# Patient Record
Sex: MALE | Race: WHITE
[De-identification: names, ages, dates, MRNs, and addresses within clinical notes are randomized per-mention and may not be internally consistent; named-entity substitution may affect disease eponyms.]

---

## 2019-01-22 ENCOUNTER — HOSPITAL ENCOUNTER (EMERGENCY)
Dept: HOSPITAL 46 - ED | Age: 84
Discharge: HOME | End: 2019-01-22
Payer: MEDICARE

## 2019-01-22 VITALS — WEIGHT: 234.99 LBS | HEIGHT: 72 IN | BODY MASS INDEX: 31.83 KG/M2

## 2019-01-22 DIAGNOSIS — I10: ICD-10-CM

## 2019-01-22 DIAGNOSIS — Z85.828: ICD-10-CM

## 2019-01-22 DIAGNOSIS — Z79.899: ICD-10-CM

## 2019-01-22 DIAGNOSIS — Z79.84: ICD-10-CM

## 2019-01-22 DIAGNOSIS — E11.9: ICD-10-CM

## 2019-01-22 DIAGNOSIS — R42: Primary | ICD-10-CM

## 2019-01-22 NOTE — EKG
[facility]
                                    [address]
                                  [place]  [address]
_________________________________________________________________________________________
                                                                 Signed   
 
 
Sinus rhythm with 1st degree AV block
Left anterior fascicular block
Possible Anterolateral infarct , age undetermined
Abnormal ECG
No previous ECGs available
Confirmed by YULISSA LYLE MD ([id number]) on 1/22/2019 11:20:16 PM
 
 
 
 
 
 
 
 
 
 
 
 
 
 
 
 
 
 
 
 
 
 
 
 
 
 
 
 
 
 
 
 
 
 
 
 
 
 
 
    Electronically Signed By: YULISSA LYLE MD  01/22/19 2320
_________________________________________________________________________________________
PATIENT NAME:     ALE CORBETT                   
MEDICAL RECORD #: [id number]                     Electrocardiogram             
          ACCT #: [id number]  
DATE OF BIRTH:   [birth year]                                       
PHYSICIAN:   YULISSA LYLE MD           REPORT #: [id number]
REPORT IS CONFIDENTIAL AND NOT TO BE RELEASED WITHOUT AUTHORIZATION

## 2024-10-08 ENCOUNTER — HOSPITAL ENCOUNTER (EMERGENCY)
Dept: HOSPITAL 46 - ED | Age: 89
Discharge: HOME | End: 2024-10-08
Payer: MEDICARE

## 2024-10-08 VITALS — BODY MASS INDEX: 25.86 KG/M2 | HEIGHT: 72 IN | WEIGHT: 190.92 LBS

## 2024-10-08 VITALS — DIASTOLIC BLOOD PRESSURE: 92 MMHG | SYSTOLIC BLOOD PRESSURE: 168 MMHG

## 2024-10-08 DIAGNOSIS — R31.0: ICD-10-CM

## 2024-10-08 DIAGNOSIS — R33.8: ICD-10-CM

## 2024-10-08 DIAGNOSIS — Z79.899: ICD-10-CM

## 2024-10-08 DIAGNOSIS — I10: ICD-10-CM

## 2024-10-08 DIAGNOSIS — N40.1: Primary | ICD-10-CM

## 2024-10-08 DIAGNOSIS — Z79.84: ICD-10-CM

## 2024-10-08 DIAGNOSIS — E11.9: ICD-10-CM

## 2024-10-08 LAB
ALBUMIN SERPL-MCNC: 3.1 G/DL (ref 3.4–5)
ALBUMIN/GLOB SERPL: 0.94 {RATIO} (ref 1.1–2.4)
ALP SERPL-CCNC: 518 U/L (ref 46–116)
ALT SERPL W P-5'-P-CCNC: 17 U/L (ref 14–59)
ANION GAP SERPL CALCULATED.4IONS-SCNC: 10.6 MMOL/L (ref 7–21)
AST SERPL-CCNC: 34 U/L (ref 15–37)
BACTERIA #/AREA URNS HPF: (no result) /HPF
BASOPHILS NFR BLD AUTO: 0.5 % (ref 0–2)
BUN SERPL-MCNC: 9 MG/DL (ref 7–18)
BUN/CREAT SERPL: 11.84 (ref 6–28.6)
CALCIUM SERPL-MCNC: 9 MG/DL (ref 8.5–10.1)
CASTS #/AREA URNS LPF: (no result) "LPF"
CHLORIDE SERPL-SCNC: 101 MMOL/L (ref 98–107)
CO2 SERPL-SCNC: 28 MMOL/L (ref 21–32)
CRYSTALS URNS MICRO: (no result)
DEPRECATED RDW RBC AUTO: 14.8 FL (ref 10.5–15)
EGFRCR SERPLBLD CKD-EPI 2021: 87 ML/MIN (ref 60–?)
EOSINOPHIL NFR BLD AUTO: 0.7 % (ref 0–6)
EPI CELLS #/AREA URNS HPF: 0 /LPF
GLOBULIN SER-MCNC: 3.3 G/DL (ref 1.8–3.5)
HCT VFR BLD AUTO: 31.8 % (ref 35–50)
HGB BLD-MCNC: 11 G/DL (ref 12–18)
HGB UR QL STRIP: (no result)
KETONES UR QL STRIP: (no result)
LEUKOCYTE ESTERASE UR QL STRIP: (no result)
LYMPHOCYTES NFR BLD AUTO: 34.1 % (ref 24–44)
MCH RBC QN AUTO: 28.6 PG (ref 27–36)
MCHC RBC AUTO-ENTMCNC: 34.5 G/DL (ref 30–36)
MCV RBC AUTO: 82.9 FL (ref 81–99)
MONOCYTES NFR BLD AUTO: 15.1 % (ref 0–12)
NEUTROPHILS NFR BLD AUTO: 49.6 % (ref 39–80)
NITRITE UR QL STRIP: (no result)
PLATELET # BLD AUTO: 110 K/UL (ref 140–440)
POTASSIUM SERPL-SCNC: 4.6 MMOL/L (ref 3.5–5.1)
PROT SERPL-MCNC: 6.4 G/DL (ref 6.4–8.2)
RBC # BLD AUTO: 3.83 M/UL (ref 4.3–5.7)
URN SPEC COLLECT METH UR: (no result)

## 2024-10-09 ENCOUNTER — HOSPITAL ENCOUNTER (EMERGENCY)
Dept: HOSPITAL 46 - ED | Age: 89
Discharge: HOME | End: 2024-10-09
Payer: MEDICARE

## 2024-10-09 VITALS — HEIGHT: 72 IN | WEIGHT: 209.44 LBS | BODY MASS INDEX: 28.37 KG/M2

## 2024-10-09 VITALS — SYSTOLIC BLOOD PRESSURE: 109 MMHG | DIASTOLIC BLOOD PRESSURE: 73 MMHG

## 2024-10-09 DIAGNOSIS — Z79.899: ICD-10-CM

## 2024-10-09 DIAGNOSIS — E10.9: ICD-10-CM

## 2024-10-09 DIAGNOSIS — Z79.84: ICD-10-CM

## 2024-10-09 DIAGNOSIS — T83.091A: Primary | ICD-10-CM

## 2024-10-09 DIAGNOSIS — I10: ICD-10-CM

## 2024-10-09 DIAGNOSIS — R31.9: ICD-10-CM

## 2024-10-09 NOTE — XMS
PreManage Notification: ALE CORBETT MRN:[id number]
 
Security Information
 
Security Events
No recent Security Events currently on file
 
 
 
CRITERIA MET
------------
- [facility] - 2 Visits in 30 Days
 
 
CARE PROVIDERS
There are no care providers on record at this time.
 
Felipe has no Care Guidelines for this patient.
 
ROSALBA VISIT COUNT (12 MO.)
-------------------------------------------------------------------------------------
2 [facility]
-------------------------------------------------------------------------------------
TOTAL 2
-------------------------------------------------------------------------------------
NOTE: Visits indicate total known visits.
 
ED/C VISIT TRACKING (12 MO.)
-------------------------------------------------------------------------------------
10/09/2024 16:17
[facility] [place]
 
TYPE: Emergency
 
COMPLAINT:
- CATH ISSUE
-------------------------------------------------------------------------------------
10/08/2024 15:36
[facility] [place]
 
TYPE: Emergency
 
COMPLAINT:
- URINATING BLOOD
-------------------------------------------------------------------------------------
 
 
INPATIENT VISIT TRACKING (12 MO.)
No inpatient visits to display in this time frame
 
[email or web address]

## 2024-10-19 ENCOUNTER — HOSPITAL ENCOUNTER (EMERGENCY)
Dept: HOSPITAL 46 - ED | Age: 89
Discharge: HOME | End: 2024-10-19
Payer: MEDICARE

## 2024-10-19 VITALS — SYSTOLIC BLOOD PRESSURE: 170 MMHG | DIASTOLIC BLOOD PRESSURE: 88 MMHG

## 2024-10-19 VITALS — WEIGHT: 209.44 LBS | HEIGHT: 72 IN | BODY MASS INDEX: 28.37 KG/M2

## 2024-10-19 DIAGNOSIS — F03.90: ICD-10-CM

## 2024-10-19 DIAGNOSIS — I10: ICD-10-CM

## 2024-10-19 DIAGNOSIS — E11.9: ICD-10-CM

## 2024-10-19 DIAGNOSIS — Z79.84: ICD-10-CM

## 2024-10-19 DIAGNOSIS — T83.091A: Primary | ICD-10-CM
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
## 2024-10-19 DIAGNOSIS — Z79.899: ICD-10-CM

## 2024-10-19 LAB
ALBUMIN SERPL-MCNC: 3.2 G/DL (ref 3.4–5)
ALBUMIN/GLOB SERPL: 0.89 {RATIO} (ref 1.1–2.4)
ALP SERPL-CCNC: 497 U/L (ref 46–116)
ALT SERPL W P-5'-P-CCNC: 18 U/L (ref 14–59)
ANION GAP SERPL CALCULATED.4IONS-SCNC: 10.3 MMOL/L (ref 7–21)
AST SERPL-CCNC: 25 U/L (ref 15–37)
BASOPHILS NFR BLD AUTO: 0.3 % (ref 0–2)
BUN SERPL-MCNC: 13 MG/DL (ref 7–18)
BUN/CREAT SERPL: 16.66 (ref 6–28.6)
CALCIUM SERPL-MCNC: 8.5 MG/DL (ref 8.5–10.1)
CHLORIDE SERPL-SCNC: 100 MMOL/L (ref 98–107)
CO2 SERPL-SCNC: 28 MMOL/L (ref 21–32)
DEPRECATED RDW RBC AUTO: 14.5 FL (ref 10.5–15)
EGFRCR SERPLBLD CKD-EPI 2021: 86 ML/MIN (ref 60–?)
EOSINOPHIL NFR BLD AUTO: 1.2 % (ref 0–6)
GLOBULIN SER-MCNC: 3.6 G/DL (ref 1.8–3.5)
HCT VFR BLD AUTO: 28.7 % (ref 35–50)
HGB BLD-MCNC: 10.1 G/DL (ref 12–18)
LYMPHOCYTES NFR BLD AUTO: 30.7 % (ref 24–44)
MCH RBC QN AUTO: 28.4 PG (ref 27–36)
MCHC RBC AUTO-ENTMCNC: 35.3 G/DL (ref 30–36)
MCV RBC AUTO: 80.4 FL (ref 81–99)
MONOCYTES NFR BLD AUTO: 14.2 % (ref 0–12)
NEUTROPHILS NFR BLD AUTO: 53.6 % (ref 39–80)
PLATELET # BLD AUTO: 125 K/UL (ref 140–440)
POTASSIUM SERPL-SCNC: 4.3 MMOL/L (ref 3.5–5.1)
PROT SERPL-MCNC: 6.8 G/DL (ref 6.4–8.2)
RBC # BLD AUTO: 3.57 M/UL (ref 4.3–5.7)

## 2024-10-19 NOTE — XMS
PreManage Notification: ALE CORBETT MRN:[id number]
 
Security Information
 
Security Events
No recent Security Events currently on file
 
 
 
CRITERIA MET
------------
- [facility] - 2 Visits in 30 Days
 
 
CARE PROVIDERS
There are no care providers on record at this time.
 
Felipe has no Care Guidelines for this patient.
 
ROSALBA VISIT COUNT (12 MO.)
-------------------------------------------------------------------------------------
3 [facility] [place]
-------------------------------------------------------------------------------------
TOTAL 4
-------------------------------------------------------------------------------------
NOTE: Visits indicate total known visits.
 
ED/UCC VISIT TRACKING (12 MO.)
-------------------------------------------------------------------------------------
10/19/2024 16:34
[facility] [place]
 
TYPE: Emergency
 
COMPLAINT:
- CATHETER ISSUE
-------------------------------------------------------------------------------------
10/13/2024 18:33
[facility]
 
TYPE: Emergency
 
DIAGNOSES:
- Gross hematuria
- Other specified disorders of prostate
- Hematuria
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
-------------------------------------------------------------------------------------
10/09/2024 16:17
[facility] [place]
 
TYPE: Emergency
 
COMPLAINT:
- CATH ISSUE
 
 
DIAGNOSES:
- Essential (primary) hypertension
- Hematuria, unspecified
- Long term (current) use of oral hypoglycemic drugs
- Other long term (current) drug therapy
- Other mechanical complication of indwelling urethral catheter, initial encounter
- Retention of urine, unspecified
- Type 1 diabetes mellitus without complications
-------------------------------------------------------------------------------------
10/08/2024 15:36
[facility] [place]
 
TYPE: Emergency
 
COMPLAINT:
- URINATING BLOOD
 
DIAGNOSES:
- Benign prostatic hyperplasia with lower urinary tract symptoms
- Essential (primary) hypertension
- Gross hematuria
- Long term (current) use of oral hypoglycemic drugs
- Other long term (current) drug therapy
- Other retention of urine
- Type 2 diabetes mellitus without complications
-------------------------------------------------------------------------------------
 
 
INPATIENT VISIT TRACKING (12 MO.)
No inpatient visits to display in this time frame
 
[email or web address]

## 2024-12-05 ENCOUNTER — HOSPITAL ENCOUNTER (EMERGENCY)
Dept: HOSPITAL 46 - ED | Age: 89
Discharge: HOME | End: 2024-12-05
Payer: MEDICARE

## 2024-12-05 VITALS — DIASTOLIC BLOOD PRESSURE: 75 MMHG | SYSTOLIC BLOOD PRESSURE: 164 MMHG

## 2024-12-05 VITALS — HEIGHT: 72 IN | BODY MASS INDEX: 26.52 KG/M2 | WEIGHT: 195.77 LBS

## 2024-12-05 DIAGNOSIS — E10.9: ICD-10-CM

## 2024-12-05 DIAGNOSIS — N39.0: Primary | ICD-10-CM

## 2024-12-05 DIAGNOSIS — B37.2: ICD-10-CM

## 2024-12-05 DIAGNOSIS — Z79.899: ICD-10-CM

## 2024-12-05 DIAGNOSIS — Z79.84: ICD-10-CM

## 2024-12-05 DIAGNOSIS — D64.9: ICD-10-CM

## 2024-12-05 DIAGNOSIS — I10: ICD-10-CM

## 2024-12-05 LAB
ALBUMIN SERPL-MCNC: 2.5 G/DL (ref 3.4–5)
ALBUMIN/GLOB SERPL: 0.51 {RATIO} (ref 1.1–2.4)
ALP SERPL-CCNC: 924 U/L (ref 46–116)
ALT SERPL W P-5'-P-CCNC: 18 U/L (ref 14–59)
ANION GAP SERPL CALCULATED.4IONS-SCNC: 12.6 MMOL/L (ref 7–21)
AST SERPL-CCNC: 61 U/L (ref 15–37)
BACTERIA #/AREA URNS HPF: (no result) /HPF
BASOPHILS NFR BLD AUTO: 1.2 % (ref 0–2)
BLD SMEAR INTERP: (no result)
BUN SERPL-MCNC: 23 MG/DL (ref 7–18)
BUN/CREAT SERPL: 29.48 (ref 6–28.6)
CALCIUM SERPL-MCNC: 8.5 MG/DL (ref 8.5–10.1)
CASTS #/AREA URNS LPF: (no result) "LPF"
CHLORIDE SERPL-SCNC: 97 MMOL/L (ref 98–107)
CO2 SERPL-SCNC: 28 MMOL/L (ref 21–32)
DEPRECATED RDW RBC AUTO: 18.4 FL (ref 10.5–15)
EGFRCR SERPLBLD CKD-EPI 2021: 86 ML/MIN (ref 60–?)
EOSINOPHIL NFR BLD AUTO: 1.1 % (ref 0–6)
EPI CELLS #/AREA URNS HPF: (no result) /LPF
GLOBULIN SER-MCNC: 4.9 G/DL (ref 1.8–3.5)
HCT VFR BLD AUTO: 28.5 % (ref 35–50)
HGB BLD-MCNC: 9 G/DL (ref 12–18)
HGB UR QL STRIP: (no result)
KETONES UR QL STRIP: (no result)
LEUKOCYTE ESTERASE UR QL STRIP: (no result)
LYMPHOCYTES NFR BLD AUTO: 23.6 % (ref 24–44)
MAGNESIUM SERPL-MCNC: 1.8 MG/DL (ref 1.8–2.4)
MCH RBC QN AUTO: 22.1 PG (ref 27–36)
MCHC RBC AUTO-ENTMCNC: 31.7 G/DL (ref 30–36)
MCV RBC AUTO: 69.5 FL (ref 81–99)
MONOCYTES NFR BLD AUTO: 13.4 % (ref 0–12)
NEUTROPHILS NFR BLD AUTO: 60.7 % (ref 39–80)
NITRITE UR QL STRIP: POSITIVE
PLATELET # BLD AUTO: 204 K/UL (ref 140–440)
POTASSIUM SERPL-SCNC: 3.6 MMOL/L (ref 3.5–5.1)
PROT SERPL-MCNC: 7.4 G/DL (ref 6.4–8.2)
RBC # BLD AUTO: 4.1 M/UL (ref 4.3–5.7)
URN SPEC COLLECT METH UR: (no result)

## 2025-01-06 ENCOUNTER — HOSPITAL ENCOUNTER (INPATIENT)
Dept: HOSPITAL 46 - ED | Age: 89
LOS: 7 days | Discharge: HOME | DRG: 698 | End: 2025-01-13
Attending: STUDENT IN AN ORGANIZED HEALTH CARE EDUCATION/TRAINING PROGRAM | Admitting: STUDENT IN AN ORGANIZED HEALTH CARE EDUCATION/TRAINING PROGRAM
Payer: MEDICARE

## 2025-01-06 VITALS — BODY MASS INDEX: 23.59 KG/M2 | HEIGHT: 72 IN | WEIGHT: 174.16 LBS

## 2025-01-06 VITALS — DIASTOLIC BLOOD PRESSURE: 74 MMHG | SYSTOLIC BLOOD PRESSURE: 163 MMHG

## 2025-01-06 DIAGNOSIS — Z79.84: ICD-10-CM

## 2025-01-06 DIAGNOSIS — T83.511A: Primary | ICD-10-CM

## 2025-01-06 DIAGNOSIS — C78.00: ICD-10-CM

## 2025-01-06 DIAGNOSIS — N39.0: ICD-10-CM

## 2025-01-06 DIAGNOSIS — Z79.811: ICD-10-CM

## 2025-01-06 DIAGNOSIS — E11.9: ICD-10-CM

## 2025-01-06 DIAGNOSIS — Z79.899: ICD-10-CM

## 2025-01-06 DIAGNOSIS — R53.81: ICD-10-CM

## 2025-01-06 DIAGNOSIS — N13.30: ICD-10-CM

## 2025-01-06 DIAGNOSIS — E87.6: ICD-10-CM

## 2025-01-06 DIAGNOSIS — E83.42: ICD-10-CM

## 2025-01-06 DIAGNOSIS — N17.9: ICD-10-CM

## 2025-01-06 DIAGNOSIS — G25.81: ICD-10-CM

## 2025-01-06 DIAGNOSIS — A41.9: ICD-10-CM

## 2025-01-06 DIAGNOSIS — D50.9: ICD-10-CM

## 2025-01-06 DIAGNOSIS — C44.90: ICD-10-CM

## 2025-01-06 DIAGNOSIS — E78.5: ICD-10-CM

## 2025-01-06 DIAGNOSIS — Z85.51: ICD-10-CM

## 2025-01-06 DIAGNOSIS — C67.9: ICD-10-CM

## 2025-01-06 DIAGNOSIS — E87.1: ICD-10-CM

## 2025-01-06 DIAGNOSIS — Z90.79: ICD-10-CM

## 2025-01-06 DIAGNOSIS — I10: ICD-10-CM

## 2025-01-06 LAB
ALBUMIN SERPL-MCNC: 2.5 G/DL (ref 3.4–5)
ALBUMIN/GLOB SERPL: 0.5 {RATIO} (ref 1.1–2.4)
ALP SERPL-CCNC: 2755 U/L (ref 46–116)
ALT SERPL W P-5'-P-CCNC: 13 U/L (ref 14–59)
ANION GAP SERPL CALCULATED.4IONS-SCNC: 14.7 MMOL/L (ref 7–21)
APTT PPP: 36.9 SEC (ref 22.9–41.3)
AST SERPL-CCNC: 90 U/L (ref 15–37)
BACTERIA #/AREA URNS HPF: (no result) /HPF
BUN SERPL-MCNC: 44 MG/DL (ref 7–18)
BUN/CREAT SERPL: 20.46 (ref 6–28.6)
CALCIUM SERPL-MCNC: 8.2 MG/DL (ref 8.5–10.1)
CASTS #/AREA URNS LPF: (no result) "LPF"
CHLORIDE SERPL-SCNC: 93 MMOL/L (ref 98–107)
CO2 SERPL-SCNC: 25 MMOL/L (ref 21–32)
CRYSTALS URNS MICRO: (no result)
DEPRECATED RDW RBC AUTO: 20.1 FL (ref 10.5–15)
EGFRCR SERPLBLD CKD-EPI 2021: 29 ML/MIN (ref 60–?)
EPI CELLS #/AREA URNS HPF: (no result) /LPF
FLUBV RNA RESP QL NAA+PROBE: NEGATIVE
GLOBULIN SER-MCNC: 5 G/DL (ref 1.8–3.5)
HCT VFR BLD AUTO: 29.8 % (ref 35–50)
HGB BLD-MCNC: 9.5 G/DL (ref 12–18)
HGB UR QL STRIP: (no result)
INR PPP: 1.24 (ref 0.8–1.3)
KETONES UR QL STRIP: (no result)
LACTATE SERPL-SCNC: 1.6 MMOL/L (ref 0.4–2)
LEUKOCYTE ESTERASE UR QL STRIP: (no result)
LYMPHOCYTES NFR BLD MANUAL: 7 %
MCH RBC QN AUTO: 20.5 PG (ref 27–36)
MCHC RBC AUTO-ENTMCNC: 31.9 G/DL (ref 30–36)
MCV RBC AUTO: 64.4 FL (ref 81–99)
MONOCYTES NFR BLD MANUAL: 7 %
NEUTS SEG NFR BLD MANUAL: 86 %
NITRITE UR QL STRIP: POSITIVE
PLATELET # BLD AUTO: 192 K/UL (ref 140–440)
POTASSIUM SERPL-SCNC: 3.7 MMOL/L (ref 3.5–5.1)
PROT SERPL-MCNC: 7.5 G/DL (ref 6.4–8.2)
PROTHROMBIN TIME: 15.6 SEC (ref 11.2–14.2)
RBC # BLD AUTO: 4.63 M/UL (ref 4.3–5.7)
RSV RNA ISLT QL NAA+PROBE: NEGATIVE
URN SPEC COLLECT METH UR: (no result)

## 2025-01-06 PROCEDURE — A9270 NON-COVERED ITEM OR SERVICE: HCPCS

## 2025-01-06 PROCEDURE — G0378 HOSPITAL OBSERVATION PER HR: HCPCS

## 2025-01-06 PROCEDURE — U0002 COVID-19 LAB TEST NON-CDC: HCPCS

## 2025-01-06 NOTE — XMS
PreManage Notification: ALE CORBETT MRN:[id number]
 
Security Information
 
Security Events
No recent Security Events currently on file
 
 
 
CRITERIA MET
------------
- 6 ED Visits in 6 Months
 
 
CARE PROVIDERS
There are no care providers on record at this time.
 
Felipe has no Care Guidelines for this patient.
 
ROSALBA VISIT COUNT (12 MO.)
-------------------------------------------------------------------------------------
5 [facility]
-------------------------------------------------------------------------------------
TOTAL 6
-------------------------------------------------------------------------------------
NOTE: Visits indicate total known visits.
 
ED/UCC VISIT TRACKING (12 MO.)
-------------------------------------------------------------------------------------
01/06/2025 19:25
[facility] [place]
 
TYPE: Emergency
 
COMPLAINT:
- WEAKNESS
-------------------------------------------------------------------------------------
12/05/2024 19:17
[facility] [place]
 
TYPE: Emergency
 
COMPLAINT:
- DIZZINESS
 
DIAGNOSES:
- Anemia, unspecified
- Candidiasis of skin and nail
- Dizziness and giddiness
- Essential (primary) hypertension
- Long term (current) use of oral hypoglycemic drugs
- Other long term (current) drug therapy
- Type 1 diabetes mellitus without complications
- Urinary tract infection, site not specified
-------------------------------------------------------------------------------------
10/19/2024 16:34
[facility]
 
 
TYPE: Emergency
 
COMPLAINT:
- CATHETER ISSUE
 
DIAGNOSES:
- Essential (primary) hypertension
- Long term (current) use of oral hypoglycemic drugs
- Other long term (current) drug therapy
- Other mechanical complication of indwelling urethral catheter, initial encounter
- Type 2 diabetes mellitus without complications
- Unspecified dementia, unspecified severity, without behavioral disturbance,
psychotic disturbance, mood disturbance, and anxiety
- Urinary catheterization as the cause of abnormal reaction of the patient, or of
later complication, without mention of misadventure at the time of the procedure
-------------------------------------------------------------------------------------
10/13/2024 18:33
[facility]
 
TYPE: Emergency
 
DIAGNOSES:
- Gross hematuria
- Other specified disorders of prostate
- Hematuria
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
-------------------------------------------------------------------------------------
10/09/2024 16:17
[facility] [place]
 
TYPE: Emergency
 
COMPLAINT:
- CATH ISSUE
 
DIAGNOSES:
- Essential (primary) hypertension
- Hematuria, unspecified
- Long term (current) use of oral hypoglycemic drugs
- Other long term (current) drug therapy
- Other mechanical complication of indwelling urethral catheter, initial encounter
- Retention of urine, unspecified
- Type 1 diabetes mellitus without complications
-------------------------------------------------------------------------------------
10/08/2024 15:36
[facility] [place]
 
TYPE: Emergency
 
COMPLAINT:
- URINATING BLOOD
 
DIAGNOSES:
- Benign prostatic hyperplasia with lower urinary tract symptoms
- Essential (primary) hypertension
- Gross hematuria
- Long term (current) use of oral hypoglycemic drugs
- Other long term (current) drug therapy
 
- Other retention of urine
- Type 2 diabetes mellitus without complications
-------------------------------------------------------------------------------------
 
 
INPATIENT VISIT TRACKING (12 MO.)
No inpatient visits to display in this time frame
 
[email or web address]

## 2025-01-07 VITALS — SYSTOLIC BLOOD PRESSURE: 104 MMHG | DIASTOLIC BLOOD PRESSURE: 53 MMHG

## 2025-01-07 VITALS — DIASTOLIC BLOOD PRESSURE: 82 MMHG | SYSTOLIC BLOOD PRESSURE: 100 MMHG

## 2025-01-07 VITALS — SYSTOLIC BLOOD PRESSURE: 117 MMHG | DIASTOLIC BLOOD PRESSURE: 52 MMHG

## 2025-01-07 VITALS — DIASTOLIC BLOOD PRESSURE: 59 MMHG | SYSTOLIC BLOOD PRESSURE: 109 MMHG

## 2025-01-07 VITALS — DIASTOLIC BLOOD PRESSURE: 53 MMHG | SYSTOLIC BLOOD PRESSURE: 104 MMHG

## 2025-01-07 VITALS — DIASTOLIC BLOOD PRESSURE: 55 MMHG | SYSTOLIC BLOOD PRESSURE: 117 MMHG

## 2025-01-07 VITALS — DIASTOLIC BLOOD PRESSURE: 52 MMHG | SYSTOLIC BLOOD PRESSURE: 117 MMHG

## 2025-01-07 VITALS — SYSTOLIC BLOOD PRESSURE: 117 MMHG | DIASTOLIC BLOOD PRESSURE: 55 MMHG

## 2025-01-07 VITALS — DIASTOLIC BLOOD PRESSURE: 74 MMHG | SYSTOLIC BLOOD PRESSURE: 163 MMHG

## 2025-01-07 LAB
ALBUMIN SERPL-MCNC: 1.9 G/DL (ref 3.4–5)
ALBUMIN/GLOB SERPL: 0.4 {RATIO} (ref 1.1–2.4)
ALP SERPL-CCNC: 2142 U/L (ref 46–116)
ALT SERPL W P-5'-P-CCNC: 12 U/L (ref 14–59)
ANION GAP SERPL CALCULATED.4IONS-SCNC: 11.3 MMOL/L (ref 7–21)
ANION GAP SERPL CALCULATED.4IONS-SCNC: 12.5 MMOL/L (ref 7–21)
ANION GAP SERPL CALCULATED.4IONS-SCNC: 13.4 MMOL/L (ref 7–21)
AST SERPL-CCNC: 58 U/L (ref 15–37)
BUN SERPL-MCNC: 41 MG/DL (ref 7–18)
BUN SERPL-MCNC: 44 MG/DL (ref 7–18)
BUN SERPL-MCNC: 44 MG/DL (ref 7–18)
BUN/CREAT SERPL: 35.48 (ref 6–28.6)
BUN/CREAT SERPL: 44.44 (ref 6–28.6)
BUN/CREAT SERPL: 49.39 (ref 6–28.6)
CALCIUM SERPL-MCNC: 7.8 MG/DL (ref 8.5–10.1)
CALCIUM SERPL-MCNC: 7.9 MG/DL (ref 8.5–10.1)
CALCIUM SERPL-MCNC: 8 MG/DL (ref 8.5–10.1)
CHLORIDE SERPL-SCNC: 101 MMOL/L (ref 98–107)
CHLORIDE SERPL-SCNC: 98 MMOL/L (ref 98–107)
CHLORIDE SERPL-SCNC: 99 MMOL/L (ref 98–107)
CO2 SERPL-SCNC: 23 MMOL/L (ref 21–32)
CO2 SERPL-SCNC: 23 MMOL/L (ref 21–32)
CO2 SERPL-SCNC: 25 MMOL/L (ref 21–32)
DEPRECATED RDW RBC AUTO: 20.3 FL (ref 10.5–15)
EGFRCR SERPLBLD CKD-EPI 2021: 56 ML/MIN (ref 60–?)
EGFRCR SERPLBLD CKD-EPI 2021: 73 ML/MIN (ref 60–?)
EGFRCR SERPLBLD CKD-EPI 2021: 84 ML/MIN (ref 60–?)
GLOBULIN SER-MCNC: 4.7 G/DL (ref 1.8–3.5)
HCT VFR BLD AUTO: 28 % (ref 35–50)
HGB BLD-MCNC: 8.8 G/DL (ref 12–18)
LYMPHOCYTES NFR BLD MANUAL: 12 %
MAGNESIUM SERPL-MCNC: 1.9 MG/DL (ref 1.8–2.4)
MCH RBC QN AUTO: 20.4 PG (ref 27–36)
MCHC RBC AUTO-ENTMCNC: 31.4 G/DL (ref 30–36)
MCV RBC AUTO: 65.1 FL (ref 81–99)
MONOCYTES NFR BLD MANUAL: 7 %
NEUTS SEG NFR BLD MANUAL: 81 %
PLATELET # BLD AUTO: 164 K/UL (ref 140–440)
POTASSIUM SERPL-SCNC: 3.3 MMOL/L (ref 3.5–5.1)
POTASSIUM SERPL-SCNC: 3.4 MMOL/L (ref 3.5–5.1)
POTASSIUM SERPL-SCNC: 3.5 MMOL/L (ref 3.5–5.1)
PROT SERPL-MCNC: 6.6 G/DL (ref 6.4–8.2)
RBC # BLD AUTO: 4.29 M/UL (ref 4.3–5.7)

## 2025-01-07 PROCEDURE — 0T9B70Z DRAINAGE OF BLADDER WITH DRAINAGE DEVICE, VIA NATURAL OR ARTIFICIAL OPENING: ICD-10-PCS | Performed by: STUDENT IN AN ORGANIZED HEALTH CARE EDUCATION/TRAINING PROGRAM

## 2025-01-07 NOTE — NUR
Digital rectal impaction removal done followed by soap suds enema. Small
amounts of brown smears removed from rectum. Bed bath, viviane and simon care
done at this time. Patient tolerated cares well. Warm blanket and new gown
provided. Call light within reach.

## 2025-01-07 NOTE — NUR
PATIENTS VITALS TAKEN AND RECORDED. PATIENT HAD SMEAR OF BM. ORVILLE CARE
COMPLETED AND NEW ATTEND IN PLACE. PATIENT REPOSITIONED IN BED. MCMILLAN CARE
COMPLETED. MCMILLAN EMPTIED. INTAKE AND OUTPUT RECORDED. PATIENT DENIES ANY AIN
OR NAUSEA. IV INFUSING PER ORDER. PM MEDS GIVEN PER ORDER. PATIENT PROVIDED
SIPS OF WATER. PATIENT PROVIDED WARM BLANKET. ASSESMENT COMPLETED. PATIENT
DENIES ANY FURTHER NEEDS. CALL LIGHT IN REACH. BED ALARM ON FOR SAFETY.

## 2025-01-07 NOTE — EKG
[facility]
                                    [address]
                                  [place]  [address]
_________________________________________________________________________________________
                                                                 Signed   
 
 
Sinus tachycardia with 1st degree AV block
Septal infarct (cited on or before 22-JAN-2019)
Possible Lateral infarct (cited on or before 22-JAN-2019)
Abnormal ECG
When compared with ECG of 27-APR-2021 18:12,
aberrant conduction is no longer present
Confirmed by Will Saucedo MD ([id number]) on 1/7/2025 4:40:42 PM
 
 
 
 
 
 
 
 
 
 
 
 
 
 
 
 
 
 
 
 
 
 
 
 
 
 
 
 
 
 
 
 
 
 
 
 
 
 
    Electronically Signed By: WILL SAUCEDO MD  01/07/25 1640
_________________________________________________________________________________________
PATIENT NAME:     ALE CORBETT                 
MEDICAL RECORD #: [id number]                     Electrocardiogram             
          ACCT #: [id number]  
DATE OF BIRTH:   [birth year]                                       
PHYSICIAN:   WILL SAUCEDO MD                       REPORT #: [id number]
REPORT IS CONFIDENTIAL AND NOT TO BE RELEASED WITHOUT AUTHORIZATION

## 2025-01-07 NOTE — NUR
PATIENT ARRIVED TO THE FLOOR VIA STRETCHER. PATIENT MOVED FROM STRETCHER TO
HOSPITAL BED BY STAFF. PATIENT INCONT STOOL. ORVILLE CARE COMPLETED. ASSEMENT
COMPLETED. IV FLUSHED AND FLUIDS INFUSING PER ORDER. CPOX IN PLACE ON PATIENTS
TOE. VITALS TAKEN AND RECORDED. SON AT BEDSIDE AND ASSISTED WITH ADMISSION.
ADMISSION COMPLETED BY CHARGE RN. PATIENT REPOSITIONED IN BED. SIPS OF WATER
PROVIDED. PATIENT IS ONLY ORIENTED TO SELF. MCMILLAN IN PLACE AND MCMILLAN CARE
COMPLETED. MCMILLAN IS CHRONIC AND CHANGED IN ED.

## 2025-01-07 NOTE — NUR
PATIENT PULLED IV OUT. PLACED NEW IV. PATIENTS IV INFUSING PER ORDER. PATIENTS
VITALS TAKEN AND RECORDD. MCMILLAN EMPTIED. INTAKE AND OUTPUT RECORDED. PATIENT
REPOSITIONED IN BED. PATIENT PROVIDED SIPS OF WATER. PATIENT APPEARS TO BE
RESTING COMFORTABLY. PATIENT REMAINS ONLY ORIENTED TO SELF. BED ALARM ON FOR
SAFETY. BLOOD SENT TO LAB ON NEW IV START.

## 2025-01-07 NOTE — NUR
PATIENT IS RESTING IN BED WITH EYES CLOSED, CPOX READINGS WNL. NAD NOTED. IV
INFUSING PER ORDER. BED ALARM ON FOR SAFETY.

## 2025-01-07 NOTE — NUR
Patient in bed resting, no distress. IV patent, fluids infusing per order.
Patient provided with another protein drink, he reports he likes them, denies
nausea.
Patient reports a poor appetite for several weeks now. Simon remains
patent, urine is cloudy yellow with sediment. Patient denies pain at this
time. Patient notably jumps with simon cath movement during basic cares.
Patient denies needs at this time, personal supplies and call light
within reach.

## 2025-01-07 NOTE — NUR
LATOYA from Dr. Saucedo for one time dose of ropinerole 0.5mg per family request
for pt's restless leg.

## 2025-01-07 NOTE — NUR
PATIENT IS RESTING IN BED WITH EYES CLOSED, CPOX READINGS ARE WNL. IV INFUSING
PER ORDER. CALL LIGHT IN REACH. BED ALARM ON FOR SAFETY.

## 2025-01-07 NOTE — NUR
PATIENT ALERT AND ORIENTED. SON, KHURRAM CHRISTENSEN, IN ROOM AT THIS TIME. PATIENT LIVES
WITH HIS SON IN [place]ING[place] LEVEL HOME. THERE ARE 2 STEPS INTO HOME. DIFFICULTY
GETTING IN AND OUT OF THE HOUSE. SON STATES HE IS UNABLE TO GET PATIENT OUT OF
THE HOUSE. INFORMATION FOR [employer] PROVIDED FOR LIFT ASSIST TO GET
PATIENT IN AND OUT OF THE HOME IF NEEDED. PATIENT STATES HE IS WANTING
TREATMENT FOR HIS CANCER DIAGNOSIS. SON STATES HE HAS AN APPOINTMENT ON
JANUARY 21 FOR FOLLOW-UP. HAS A CHRONIC CATHETER IN PLACE. NO HOME HEALTH OR
CAREGIVERS FOR PATIENT. GRANDDAUGHTER DOES ASSIST AS NEEDED FOR CARES.
DISCUSSED HOME HEALTH OPTION WITH SON AND PATIENT AS AN OPTION FOR DISCHARGE.
SON WOULD PREFER [facility]. PATIENT DOES HAVE WALKER,
WHEELCHAIR, CANE AND SHOWER CHAIR AT HOME. PATIENT DOES NOT USE HIS SHOWER
CHAIR. SON DRIVES PATIENT. SON ALSO DENIES FINANCIAL NEEDS/ASSISTANCE AT THIS
TIME. PLANS TO TAKE PATIENT HOME WHEN HE IS MEDICALLY STABLE AND CONTINUING TO
CARE FOR HIM WHEN DISCHARGED. WOULD LIKE TO HAVE HOME HEALTH FOR ASSISTANCE.

## 2025-01-07 NOTE — NUR
Patient alert to self, pleasantly confused. Patient able to tolerated po
medications crushed with applesauce. Iv abx infusing. Meza intact/patent,
notable sediment with light colored blood clots noted.
 
Per Dr. Saucedo verbal order-digital disimpaction followed with an enema x1 for
constipation. Patient verbalized his understanding of needed treatment.

## 2025-01-07 NOTE — NUR
RECEIVED REPORT FROM DAY SHIFT RN. PATIENT IS RESTING IN BED. PATIENT DENIES
ANY NEEDS. CALL LIGHT IN REACH. BED ALARM ON FOR SAFETY.

## 2025-01-07 NOTE — NUR
Rocephin and sliding scale unsulin dropped off emar. Updated Dr. Braxton-new
orders to be placed by Dr. Braxton for rocephin tomorrow and to continue
to monitor trending blood sugars for now.

## 2025-01-08 VITALS — SYSTOLIC BLOOD PRESSURE: 128 MMHG | DIASTOLIC BLOOD PRESSURE: 62 MMHG

## 2025-01-08 VITALS — DIASTOLIC BLOOD PRESSURE: 58 MMHG | SYSTOLIC BLOOD PRESSURE: 116 MMHG

## 2025-01-08 VITALS — SYSTOLIC BLOOD PRESSURE: 123 MMHG | DIASTOLIC BLOOD PRESSURE: 56 MMHG

## 2025-01-08 VITALS — DIASTOLIC BLOOD PRESSURE: 47 MMHG | SYSTOLIC BLOOD PRESSURE: 112 MMHG

## 2025-01-08 VITALS — DIASTOLIC BLOOD PRESSURE: 63 MMHG | SYSTOLIC BLOOD PRESSURE: 119 MMHG

## 2025-01-08 VITALS — DIASTOLIC BLOOD PRESSURE: 62 MMHG | SYSTOLIC BLOOD PRESSURE: 128 MMHG

## 2025-01-08 LAB
ABO GROUP BLD: (no result)
ANION GAP SERPL CALCULATED.4IONS-SCNC: 11.5 MMOL/L (ref 7–21)
BASOPHILS NFR BLD AUTO: 0.1 % (ref 0–2)
BASOPHILS NFR BLD AUTO: 0.3 % (ref 0–2)
BLD SMEAR INTERP: (no result)
BUN SERPL-MCNC: 28 MG/DL (ref 7–18)
BUN/CREAT SERPL: 49.12 (ref 6–28.6)
CALCIUM SERPL-MCNC: 7.8 MG/DL (ref 8.5–10.1)
CHLORIDE SERPL-SCNC: 100 MMOL/L (ref 98–107)
CO2 SERPL-SCNC: 25 MMOL/L (ref 21–32)
DEPRECATED RDW RBC AUTO: 19.7 FL (ref 10.5–15)
DEPRECATED RDW RBC AUTO: 20 FL (ref 10.5–15)
EGFRCR SERPLBLD CKD-EPI 2021: 94 ML/MIN (ref 60–?)
EOSINOPHIL NFR BLD AUTO: 0.1 % (ref 0–6)
EOSINOPHIL NFR BLD AUTO: 0.1 % (ref 0–6)
HCT VFR BLD AUTO: 22.4 % (ref 35–50)
HCT VFR BLD AUTO: 24 % (ref 35–50)
HGB BLD-MCNC: 7.1 G/DL (ref 12–18)
HGB BLD-MCNC: 7.5 G/DL (ref 12–18)
IAT POLY-SP REAG SERPL QL: NEGATIVE
LYMPHOCYTES NFR BLD AUTO: 13.2 % (ref 24–44)
LYMPHOCYTES NFR BLD AUTO: 15.3 % (ref 24–44)
MAGNESIUM SERPL-MCNC: 1.7 MG/DL (ref 1.8–2.4)
MCH RBC QN AUTO: 20.5 PG (ref 27–36)
MCH RBC QN AUTO: 20.5 PG (ref 27–36)
MCHC RBC AUTO-ENTMCNC: 31.4 G/DL (ref 30–36)
MCHC RBC AUTO-ENTMCNC: 31.5 G/DL (ref 30–36)
MCV RBC AUTO: 64.9 FL (ref 81–99)
MCV RBC AUTO: 65.3 FL (ref 81–99)
MONOCYTES NFR BLD AUTO: 14.1 % (ref 0–12)
MONOCYTES NFR BLD AUTO: 16 % (ref 0–12)
NEUTROPHILS NFR BLD AUTO: 68.5 % (ref 39–80)
NEUTROPHILS NFR BLD AUTO: 72.3 % (ref 39–80)
PHOSPHATE SERPL-MCNC: 2 MG/DL (ref 2.5–4.9)
PLATELET # BLD AUTO: 141 K/UL (ref 140–440)
PLATELET # BLD AUTO: 149 K/UL (ref 140–440)
POTASSIUM SERPL-SCNC: 3.5 MMOL/L (ref 3.5–5.1)
RBC # BLD AUTO: 3.45 M/UL (ref 4.3–5.7)
RBC # BLD AUTO: 3.68 M/UL (ref 4.3–5.7)
RH BLD: NEGATIVE
TRANSF BAND NUM PATIENT: (no result)

## 2025-01-08 NOTE — NUR
PATIENT HAD SMEAR OF BM. ORVILLE CARE COMPLETED AND NEW ATTEND IN PLACE. CATH
CARE COMPLETED. PATIENTS VITALS TAKEN AND RECORDED. MCMILLAN EMPTIED AND INTAKE
AND OUTPUT RECORDED. PATIENT ONLY ORIENT TO SELF. PATIENT DENIES ANY NEEDS.
CALL LIGHT IN REACH. IV INFUSING PER ORDER. BED ALARM ON FOR SAFETY.

## 2025-01-08 NOTE — NUR
PATIENT REPOSITIONED ONTO HIS RIGHT SIDE. PATIENT PROVIDED WARM BLANKET.
PATIENT DENIES ANY FURTHER NEEDS. CALL LIGHT IN REACH. IV INFUSING PER ORDER.
BED ALARM ON FOR SAFETY.

## 2025-01-08 NOTE — NUR
PATIENT HAD SMEAR OF BM. ORVILLE CARE COMPLETED AND NEW ATTEND IN PLACE. PATIENT
REPOSITIONED IN BED. PATIENT HAS PILLOWS UNDER BILAT HIPS. PATIENT DENIES ANY
PAIN. SIPS OF WATER PROVIDED WARM BLANKET PROVIDED. PATIENT DENIES ANY FURTHER
NEEDS. CALL LIGHT IN REACH. IV INFUSING PER ORDER.

## 2025-01-08 NOTE — NUR
PATIENT IS RESTING IN BED WITH EYES CLOSED, RR 15. CALL LIGHT IN REACH. IV
INFUSING PER ORDER. BED ALARM ON FOR SAFETY. NAD NOTED.

## 2025-01-08 NOTE — NUR
UR CLINICAL REVIEW:
2 MN FOR VERSALUS-MEETS
INPT CRITERIA FOR UTI WITH
NEED FOR IV ABX
MEDICARE
INPT 01/07/25 @ 0946
ORDER MATCHES REG
NO AUTH REQUIRED PER
MEDICARE GUIDELINES
POSSIBLE DISCHARGE TO
SN/HOSPICE PENDING FURTHER
EVALUATION

## 2025-01-08 NOTE — NUR
REPORT RECIEVED FROM DAY SHIFT RN. PATIENT RESTING IN BED WATCHING TV. DENIES
NEEDS AT THIS TIME. CALL LIGHT IN REACH.

## 2025-01-08 NOTE — NUR
ATTEMPT TO CALL ALE CHRISTENSEN, SON. WAS NOTIFIED PATIENT IS FOLLOWING UP WITH
SPECIALIST REGARDING BLADDER CANCER FOR TREATMENT ON 01/7/25 BY PATIENT AND
SON. HOSPITALISTS WERE TOLD THEY ARE NOT PURSUING TREATMENT. NO ANSWER FROM
SON WHEN CALLED. UNABLE TO LEAVE MESSAGE. WILL REATTEMPT TO CONTACT SON AGAIN.
PT/OT ARE RECOMMENDING SNF.

## 2025-01-08 NOTE — NUR
PATIENT CALLING OUT. PATIENT REQUESTING TO BE REPOSITIONED. PATIENT
REPOSITIONED PER REQUEST. PATIENT DENIES ANY FURTHER NEEDS. CALL LIGHT IN
REACH. BED ALARM ON FOR SAFETY. IV INFUSING PER ORDER.

## 2025-01-08 NOTE — NUR
PATIENT RESTING IN BED ON BACK WITH EYES CLOSED. RESPIRATIONS EVEN AND
UNLABORED. CALL LIGHT IN REACH.

## 2025-01-08 NOTE — NUR
Patient awake, in pleasant mood, alert to self and place. Patient is on room
air, respirations non labored. IV patent, fluids infusing per order. Meza in
place, patent-cloudy yellow urine with sediment noted. Brief is dry. Miralax
admin this morning. Patient denies pain. No current needs, call light within
reach.

## 2025-01-08 NOTE — NUR
Patient sitting up in bed visiting with his son. Patient sipping on his
protein shake. Iv lines are patent, fluids infusing per order. Patient denies
needs.

## 2025-01-08 NOTE — NUR
PATIENT RESTING IN BED. VS, I&Os, AND BS OBTAINED AND RECORDED. SCHEDULED
MEDICATIONS ADMINISTERED. ASSESSMENT COMPLETE. MCMILLAN CATH CARE PROVIDED PER
PROTOCOL. BED ALARM ON. SF PUDDING PROVIDED.
IVs FLUSH WNL. NEW BAG IV FLUID INFUSING PER ORDER. PATIENT HAS NO FURTHER
NEEDS. CALL LIGHT IN REACH.

## 2025-01-08 NOTE — NUR
PATIENT CALLING OUT. THIS RN INTO ROOM. PATIENT STATED "I JUST HURT ALL OVER".
PATIENT REPOSITIONED AND PRN MEDICATION GIVEN PER ORDER. PATIENT DENIES ANY
FURTHER NEEDS. CALL LIGHT IN REACH. BED ALARM ON FOR SAFETY.

## 2025-01-08 NOTE — NUR
Patient in bed resting, easily wakes to verbal stimuli. Patient denies pain at
this time. No needs at this time, warm blanket provided.

## 2025-01-09 VITALS — DIASTOLIC BLOOD PRESSURE: 66 MMHG | SYSTOLIC BLOOD PRESSURE: 129 MMHG

## 2025-01-09 VITALS — DIASTOLIC BLOOD PRESSURE: 70 MMHG | SYSTOLIC BLOOD PRESSURE: 125 MMHG

## 2025-01-09 VITALS — SYSTOLIC BLOOD PRESSURE: 115 MMHG | DIASTOLIC BLOOD PRESSURE: 55 MMHG

## 2025-01-09 VITALS — SYSTOLIC BLOOD PRESSURE: 121 MMHG | DIASTOLIC BLOOD PRESSURE: 55 MMHG

## 2025-01-09 VITALS — DIASTOLIC BLOOD PRESSURE: 55 MMHG | SYSTOLIC BLOOD PRESSURE: 121 MMHG

## 2025-01-09 VITALS — SYSTOLIC BLOOD PRESSURE: 128 MMHG | DIASTOLIC BLOOD PRESSURE: 61 MMHG

## 2025-01-09 VITALS — DIASTOLIC BLOOD PRESSURE: 61 MMHG | SYSTOLIC BLOOD PRESSURE: 128 MMHG

## 2025-01-09 VITALS — SYSTOLIC BLOOD PRESSURE: 129 MMHG | DIASTOLIC BLOOD PRESSURE: 66 MMHG

## 2025-01-09 LAB
ANION GAP SERPL CALCULATED.4IONS-SCNC: 10.3 MMOL/L (ref 7–21)
BASOPHILS NFR BLD AUTO: 0.2 % (ref 0–2)
BLD SMEAR INTERP: (no result)
BUN SERPL-MCNC: 15 MG/DL (ref 7–18)
BUN/CREAT SERPL: 31.25 (ref 6–28.6)
CALCIUM SERPL-MCNC: 7.7 MG/DL (ref 8.5–10.1)
CHLORIDE SERPL-SCNC: 97 MMOL/L (ref 98–107)
CO2 SERPL-SCNC: 27 MMOL/L (ref 21–32)
DEPRECATED RDW RBC AUTO: 20 FL (ref 10.5–15)
EGFRCR SERPLBLD CKD-EPI 2021: 99 ML/MIN (ref 60–?)
EOSINOPHIL NFR BLD AUTO: 0.3 % (ref 0–6)
HCT VFR BLD AUTO: 22.6 % (ref 35–50)
HGB BLD-MCNC: 7.8 G/DL (ref 12–18)
LYMPHOCYTES NFR BLD AUTO: 21.9 % (ref 24–44)
MAGNESIUM SERPL-MCNC: 1.6 MG/DL (ref 1.8–2.4)
MCH RBC QN AUTO: 22 PG (ref 27–36)
MCHC RBC AUTO-ENTMCNC: 34.4 G/DL (ref 30–36)
MCV RBC AUTO: 64.1 FL (ref 81–99)
MONOCYTES NFR BLD AUTO: 17.3 % (ref 0–12)
NEUTROPHILS NFR BLD AUTO: 60.3 % (ref 39–80)
PHOSPHATE SERPL-MCNC: 2.4 MG/DL (ref 2.5–4.9)
PLATELET # BLD AUTO: 153 K/UL (ref 140–440)
POTASSIUM SERPL-SCNC: 3.3 MMOL/L (ref 3.5–5.1)
RBC # BLD AUTO: 3.52 M/UL (ref 4.3–5.7)

## 2025-01-09 NOTE — NUR
IV PUMP ALARMING. NEW BAG IV FLUID INFUSING PER ORDER. VS AND I&Os OBTAINED
AND RECORDED. FRESH ICE WATER PROVIDED. PATIENT HAS NO FURTHER NEEDS AT THIS
TIME. CALL LIGHT IN REACH.

## 2025-01-09 NOTE — NUR
REPORT RECIEVED FROM DAY SHIFT RN. PATIENT RESTING IN BED. DENIES NEEDS AT
THIS TIME. CALL LIGHT IN REACH.

## 2025-01-09 NOTE — NUR
REPORT RECEIVED FROM KVNG BENITEZ. PATIENT IS LYING IN BED WITH EYES OPEN AND
LISTENING TO MUSIC. PATIENT WITH CPOX AT THE BEDSIDE. PATIENT STATED NO
FURTHER NEEDS AT THIS TIME. CALL LIGHT AND PERSONAL BELONGINGS ARE WITHIN
REACH.

## 2025-01-09 NOTE — NUR
Patient assisted back to bed per his request via colby lift. Patient remains
on room air, respirations non labored. Pillows in use for comfort. IV sites
remains patent, fluids infusing per order. Patient's son at bedside visiting.
No needs at this time, call light within reach.

## 2025-01-09 NOTE — NUR
FULL ASSESSMENT COMPLETE AND DOCUMENTED IN THE CHART. PATIENT IS ALERT AND
ORIENTED TIMES 3. PATIENT IS NOT ORIENTED TO THE MONTH. PATIENT WITH
GENERALIZED WEAKNESS. SKIN WITH SCATTERED BRUISING AND SCATTERED SCABS NOTED.
IV SITES BOTH FLUSHED WITH 10 ML NORMAL SALINE. IV DRESSINGS ARE CLEAN DRY AND
INTACT. LR IS INFUSING  ML/HR. POTASSIUM PHOSPHATE IS ALSO INFUSING INTO
ONE OF THE IV SITES AT THIS TIME. PATIENT IS ON ROOM AIR WITH THE CPOX AT
BEDSIDE. LUNG SOUNDS ARE CLEAR IN ALL LUNG MEDEL BILATERALLY. PATIENT IS ON A
REGULAR DIET WITH MINCED AND MOIST TEXTURE. BOWEL TONES ARE ACTIVE IN ALL FOUR
QUADRANTS. LAST BM WAS 01/09/25. CARDIAC WITH NORMAL S1 AND S2 ON
AUSCULTATION. RADIAL AND PEDAL PULSES ARE STRONG BILATERALLY. CAPILLARY REFILL
IN THE UPPER AND LOWER EXTREMITIES IS LESS THAN 3 SECONDS BILATERALLY. TRACE
AMOUNT OF DEPENDENT EDEMA NOTED IN THE BLE. SENSATION INTACT WITH NO
COMPLAINTS OF NUMBNESS OR TINGLING. PATIENT WITH NO COMPLAINTS OF PAIN.
PATIENT STATED NO FURTHER NEEDS AT THIS TIME. FRESH CUP OF ICE WATER
PROVIDED. CALL LIGHT AND PERSONAL BELONGINGS ARE WITHIN REACH.

## 2025-01-09 NOTE — NUR
PATIENT RESTING IN BED. SCHEDULED MEDICATION ADMINISTERED. NEW BAG IV FLUID
INFUSING PER ORDER. MCMILLAN CATH CARE PROVIDED PER ORDER. SCHEDULED POWDER
PLACED ON ORVILLE AREA. PATIENT REPOSITONED IN BED WITH 2 PILLOWS PLACED UNDER
RIGHT SIDE. BARRIER CREAM APPLIED TO COCCYX.
BED ALARM ON. SF PUDDING PROVIDED. PATIENT HAS NO FURTHER NEEDS. CALL LIGHT
IN REACH.

## 2025-01-09 NOTE — NUR
In to see Sumanth this moring and explain his IMM letter to him.  Initially
Sumanth was concerned because he has insurance from the [employer].  Further
explanation given.  Greg verbalizes understanding, and verbalizes that he does
desire to return home with his son on discharge, which he hopes, per his
statement, will happen today.  Letter is signed and a copy of the signed
letter is given to Sumanth.  He denies further questions or needs at this time.

## 2025-01-09 NOTE — NUR
CNA OBTAINED VITALS, I&O, AND BLOOD SUGAR. RN NOTIFED OF BLOOS SUGAR. MCMILLAN
CATH EMPTIED AND PT ICE WATER REFILLED. PT STATES NO FURTHER NEEDS AT THIS
TIME. CALL LIGHT WITHIN REACH.

## 2025-01-09 NOTE — NUR
PATIENT UP IN RECLINER. SON IN ROOM. VERIFIED THEY ARE CONTINUING TO PURSUE
TREATMENT FOR CANCER. PATIENT STATES HE WILL DO CHEMO OR RADIATION IF
RECOMMENDED. DISCUSSED WITH PATIENT AND SON HOME HEALTH AS OPTION DUE TO
PATIENT'S CONTINUED WEAKNESS. SON IS COMFORTABLE TAKING PATIENT HOME WITH HOME
HEALTH, NO SNF AT THIS TIME DUE TO WANT TO PURSUE CANCER TREATMENT. NAI,
CHARGE NURSE, UPDATED.

## 2025-01-09 NOTE — NUR
Patient in bed resting, eyes closed, respirations even and non labored.
Patient has no notable distress. SPO2 99% on room air. IV patent, fluids
infusing per order. Call light within reach.

## 2025-01-09 NOTE — NUR
PATIENT IS LYING IN BED WITH THE MUSIC ON THE TV. IV PUMP CLEARED OF INTAKE
FLUIDS. PATIENT GIVEN THE TV REMOTE. PATIENT STATED NO FURTHER NEEDS AT
THIS TIME, CALL LIGHT AND PERSONAL BELONGINGS ARE WITHIN REACH.

## 2025-01-09 NOTE — NUR
PATIENT IS LYING IN BED WITH THE TV ON. DAVID ANGELES IS IN THE ROOM AND TAKING
THE PATIENTS VITAL SIGNS. CALL LIGHT AND PERSONAL BELONGINGS ARE WITHIN REACH.

## 2025-01-09 NOTE — NUR
PT CPOX ALARMING, TAG CURRENTLY HAS IT ON THE TOE, MOVED TO FINGER AND SATS
98% ON RA. NO OTHER NEEDS AT THIS TIME.

## 2025-01-10 VITALS — DIASTOLIC BLOOD PRESSURE: 67 MMHG | SYSTOLIC BLOOD PRESSURE: 150 MMHG

## 2025-01-10 VITALS — DIASTOLIC BLOOD PRESSURE: 64 MMHG | SYSTOLIC BLOOD PRESSURE: 133 MMHG

## 2025-01-10 VITALS — SYSTOLIC BLOOD PRESSURE: 121 MMHG | DIASTOLIC BLOOD PRESSURE: 59 MMHG

## 2025-01-10 VITALS — SYSTOLIC BLOOD PRESSURE: 137 MMHG | DIASTOLIC BLOOD PRESSURE: 63 MMHG

## 2025-01-10 VITALS — SYSTOLIC BLOOD PRESSURE: 133 MMHG | DIASTOLIC BLOOD PRESSURE: 64 MMHG

## 2025-01-10 VITALS — SYSTOLIC BLOOD PRESSURE: 131 MMHG | DIASTOLIC BLOOD PRESSURE: 63 MMHG

## 2025-01-10 VITALS — SYSTOLIC BLOOD PRESSURE: 150 MMHG | DIASTOLIC BLOOD PRESSURE: 67 MMHG

## 2025-01-10 LAB
ANION GAP SERPL CALCULATED.4IONS-SCNC: 8.7 MMOL/L (ref 7–21)
BASOPHILS NFR BLD AUTO: 0.2 % (ref 0–2)
BLD SMEAR INTERP: (no result)
BUN SERPL-MCNC: 8 MG/DL (ref 7–18)
BUN/CREAT SERPL: 17.39 (ref 6–28.6)
CALCIUM SERPL-MCNC: 7.8 MG/DL (ref 8.5–10.1)
CHLORIDE SERPL-SCNC: 101 MMOL/L (ref 98–107)
CO2 SERPL-SCNC: 28 MMOL/L (ref 21–32)
DEPRECATED RDW RBC AUTO: 19.7 FL (ref 10.5–15)
EGFRCR SERPLBLD CKD-EPI 2021: 101 ML/MIN (ref 60–?)
EOSINOPHIL NFR BLD AUTO: 0.7 % (ref 0–6)
HCT VFR BLD AUTO: 23.5 % (ref 35–50)
HGB BLD-MCNC: 7.4 G/DL (ref 12–18)
LYMPHOCYTES NFR BLD AUTO: 28 % (ref 24–44)
MAGNESIUM SERPL-MCNC: 1.8 MG/DL (ref 1.8–2.4)
MCH RBC QN AUTO: 20.4 PG (ref 27–36)
MCHC RBC AUTO-ENTMCNC: 31.7 G/DL (ref 30–36)
MCV RBC AUTO: 64.4 FL (ref 81–99)
MONOCYTES NFR BLD AUTO: 18.3 % (ref 0–12)
NEUTROPHILS NFR BLD AUTO: 52.8 % (ref 39–80)
PHOSPHATE SERPL-MCNC: 2.5 MG/DL (ref 2.5–4.9)
PLATELET # BLD AUTO: 160 K/UL (ref 140–440)
POTASSIUM SERPL-SCNC: 3.7 MMOL/L (ref 3.5–5.1)
RBC # BLD AUTO: 3.64 M/UL (ref 4.3–5.7)

## 2025-01-10 NOTE — NUR
ROUNDING ON PATIENT. PATIENT RIGHT UPPER ARM IV DCd BY PATIENT. PATIENT IV
LAYING ON FLOOR NEXT TO BED. IV TIP INTACT.
IV FLUID NOW INFUSING INTO R FOREARM IV.
NO FURTHER NEEDS. PATIENT EDUCATED ON IV AND TO TRY AND NOT PULL ON IT.
PATIENT VERBILIZES UNDERSTANING.
NO FURTHER NEEDS AT THIS TIME. CALL LIGHT IN REACH.

## 2025-01-10 NOTE — NUR
PATIENT IS LYING IN BED WITH EYES OPEN AND RESPIRATIONS ARE EVEN AND
UNLABORED. PATIENT IS LOOKING FOR SOMETHING TO WATCH ON TV. CALL LIGHT AND
PERSONAL BELONGINGS ARE WITHIN REACH.

## 2025-01-10 NOTE — NUR
Multiple conversations with Patient, son and daughter-in-law this afternoon
regarding DC plan. After son was given caregiver instructions with PT, he was
unsure of ability to take patient home. Discussed Assisted living cost with
son. States patient and he are unable to cover those costs. Phone number for
[facility] given and discussed long term medicaid as an option and instructed him to
call and start process as it is 35-45 days to qualify. Informed importance to
start the process as patient is likely to continue to decline and will need
more assistance than can be provided at home. PT and OT are recommending Belinda
lift, hospital bed, shower chair at home. Patient does have a wheelchair.
Unable to have hospital bed delivered until Monday at the earliest. Discussed
SNF again with patient and family members. They are agreeable to SNF if
facility accepts him. They prefer him stay in town, however [facility] does not have any open male beds at this time. Referral faxed to
[facility] for possibility of open beds next week.
They are agreeable to SNF out of town if accepted. Called [facility] and they
do have a belinda lift for lease available, no slings. This information was
provided to son and daughter-in-law. DC plan is now pending SNF acceptance
versus equipment delivered to home at the beginning of next week.

## 2025-01-10 NOTE — NUR
PATIENT IS LYING IN BED AND LISTENING TO MUSIC. PATIENT STATED NO NEEDS AT
THIS TIME. CALL LIGHT AND PERSONAL BELONGINGS ARE WITHIN REACH.

## 2025-01-10 NOTE — NUR
CPOX TAKEN OFF SURE TO SPO2 LEVELS BEING GREATER THAN 95% ON ROOM AIR
THROUGHOUT THE DAY. PATIENT STATED NO FURTHER NEEDS AT THIS TIME. CALL LIGHT
AND PERSONAL BELONGINGS ARE WITHIN REACH.

## 2025-01-10 NOTE — NUR
PATIENT IS LYING IN BED AND WATCHING TV. PATIENT IS ON RA AND CPOX IS AT THE
BEDSIDE. PATIENT STATED NO NEEDS AT THIS TIME. CALL LIGHT AND PERSONAL
BELONGINGS ARE WITHIN REACH.

## 2025-01-10 NOTE — NUR
PATIENT REPOSITIONED WITH TWO PILLOWS UNDER LEFT SIDE. PATIENT HAS COMPLAINTS
OF BACK PAIN BUT STATES THAT "IT IS NORMAL AND NOT BECAUSE OF THE HOSPITAL".
PRN PAIN MEDICATION ADMINSITERED. NO FURTHER NEEDS. BED ALARM ON. CALL LIGHT
IN REACH.

## 2025-01-10 NOTE — NUR
PATIENT IS LYING IN BED WITH HOB ELEVATED. DAVID ANGELES IS IN THE ROOM AT THIS
TIME AND GETTING VITAL SIGNS AND INTAKE AND OUTPUT. PATIENT STATED NO NEEDS AT
THIS TIME. CALL LIGHT AND PERSONAL BELONGINGS ARE WITHIN REACH.

## 2025-01-10 NOTE — NUR
LR  ML/HR RECONNECTED AFTER PATIENT WORKED WITH PT/OT. PATIENT SON IS
SITTING ON THE COUCH. PATIENT STATED NO FURTHER NEEDS AT THIS TIME. CALL LIGHT
AND PERSONAL BELONGINGS ARE WITHIN REACH.

## 2025-01-10 NOTE — NUR
0900 MEDICATIONS ADMINISTERED PER THE EMAR. FULL ASSESSMENT COMPLETE AND
DOCUMENTED IN THE CHART. PATIENT IS ALERT AND ORIENTED TIMES 3. PATIENT IS NOT
ORIENTED TO MONTH. PATIENT WITH A CHRONIC MCMILLAN CATHETER THAT WAS CHANGED ON
01/07/25. ORVILLE CARE AND MCMILLAN CARE COMPLETE. PATIENT HAD A FIRM BOWEL
MOVEMENT. NEW BRIEF IN PLACE. DESENEX POWDER PLACE ON COCCYX WITH BARRIER
CREAM AND DESENEX POWDER PLACED IN THE ORVILLE AREA. PATIENT IS ON ROOM AIR WITH
THE CPOX AT BEDSIDE. LUNG SOUNDS ARE CLEAR IN ALL LUNG MEDEL BILATERALLY.
CARDIAC WITH NORMAL S1 AND S2 ON AUSCULTATION. RADIAL AND PEDAL PULSES ARE
STRONG BILATERALLY. CAPILLARY REFILL IS LESS THAN 3 SECONDS IN THE UPPER AND
LOWER EXTREMITIES. SENSATION INTACT WITH NO COMPLAINTS OF NUMBNESS OR
TINGLING. PATIENT IS ON A REGULAR DIET WITH MINCED AND MOIST TEXTURE. PATIENT
WITH POOR APPETITE. PATIENT ENCOURAGED TO DRINK ENSURES WITH MEALS. BOWEL
TONES ARE ACTIVE IN ALL FOUR QUADRANATS. IV IN THE RIGHT LOWER ARM FLUSHED
WITH 10 ML NORMAL SALINE AND IS SALINE LOCKED. IV DRESSINGS ARE CLEAN, DRY,
AND INTACT. IV IN THE RIGHT UPPER ARM FLUSHED WITH 10 ML NORMAL SALINE AND LR
IS INFUSING  ML/HR. SKIN ASSESSMENT COMPLETE WITH DINO DUMONT RN.
SKIN WITH SCATTERED SCABS AND BRUISING. BLISTERS ON THE BILATERAL ANKLES AND
LEFT GLUTEAL CLEFT. FOAM ADHESIVE BORDER DRESSINGS PLACED OVER THE BLISTERS.
NO DRAINAGE NOTED. PATIENT WITH NO COMPLAINTS OF PAIN. PATIENT WITH A VISITOR
IN THE ROOM THROUGHOUT PATIENT INTERACTION. PATIENT STATED NO FURTHER NEEDS AT
THIS TIME. CALL LIGHT AND PERSONAL BELONGINGS ARE WITHIN REACH.

## 2025-01-10 NOTE — NUR
PATIENT RESTING IN BED. VS AND I&Os OBTAINED AND RECORDED. SCHEDULED
MEDICATION ADMINISTERED. PATIENT REPOSITONED IN BED WITH 2 PILLOWS PLACED
UNDER RIGHT SIDE. 2 PILLOWS PLACED UNDER BLE TO KEEP HEELS OFF BED. NEW BAG IV
FLUID INFUSING PER ORDER. ASSESSMENT COMPLETE. BED ALARM ON. PATIENT HAS NO
FURTHER NEEDS. CALL LIGHT IN REACH.

## 2025-01-10 NOTE — NUR
REPORT RECEIVED FROM NIGHT SHIFT RN SLY. PATIENT IS LYING IN BED WITH EYES
CLOSED AND RESPIRATIONS ARE EVEN AND UNLABORED. PATIENT STATED NO NEEDS AT
THIS TIME. CALL LIGHT AND PERSONAL BELONGINGS ARE WITHIN REACH.

## 2025-01-10 NOTE — NUR
IFEANYI AND I WENT IN TO CHECK TO SEE IF PATIENT NEEDED TO BE CHANGED AND HE DID.
ORVILLE CARE AND CATH CARE DONE. WE PULLED HIM UP IN BED.

## 2025-01-10 NOTE — NUR
PATIENT IS LYING IN BED WITH HOB ELEVATED. PATIENT WITH WATCHING TV. CPOX AT
BEDSIDE. PATIENT STATED NO NEEDS AT THIS TIME. CALL LIGHT AND PERSONAL
BELONGINGS ARE WITHIN REACH.

## 2025-01-10 NOTE — NUR
PATIENT RESTING IN BED. PATIENT REPOSITIONED IN BED WITH PILLOWS PLACED UNDER
BILAT HIPS. BLE ELEVATED ON PILLOWS. VS AND I&Os OBTAINED AND RECORDED.
PATIENT HAS NO FURTHER NEEDS. CALL LIGHT IN REACH.

## 2025-01-10 NOTE — NUR
PATIENT IS LYING IN BED WITH HOB ELEVATED. PATIENT WITH TWO VISITORS SITTING
ON THE COUCH IN THE PATIENTS ROOM. PATIENT WITH NO COMPLAINTS OF PAIN WHEN
ASKED BY RN. IV SITE IN THE R UPPER ARM FLUSHED WITH 10 ML NORMAL SALINE. NEW
BAG OF LR IS INFUSING  ML/HR. IV DRESSING IS CLEAN, DRY, AND INTACT. IV
IN THE R FOREARM FLUSHED WITH 10 ML NORMAL SALINE AND IS SALINE LOCKED. IV
DRESSING IS CLEAN, DRY, AND INTACT. PATIENT IS ALERT AND ORIENTED TIMES FOUR.
PATIENT STATED NO FURTHER NEEDS AT THIS TIME. CALL LIGHT AND PERSONAL
BELONGINGS ARE WITHIN REACH.

## 2025-01-10 NOTE — NUR
INTO SEE PATIENT. TALKED TO PATIENT ABOUT DISCHARGE. PATIENT AGREEABLE TO HOME
HEALTH. WILL SEND REFFERAL TO [facility] AT TIME OF DISCHARGE. NO OTHER
NEEDS AT THIS TIME.

## 2025-01-10 NOTE — NUR
Let family know we will not be able to D/C patient until we are able to get
medical equipment or a SNF acceptance.

## 2025-01-10 NOTE — NUR
CHART FAXED TO [facility] AT THE [place] [facility] AWAITING TO HEAR BACK.

## 2025-01-11 VITALS — DIASTOLIC BLOOD PRESSURE: 63 MMHG | SYSTOLIC BLOOD PRESSURE: 142 MMHG

## 2025-01-11 VITALS — SYSTOLIC BLOOD PRESSURE: 151 MMHG | DIASTOLIC BLOOD PRESSURE: 67 MMHG

## 2025-01-11 VITALS — SYSTOLIC BLOOD PRESSURE: 162 MMHG | DIASTOLIC BLOOD PRESSURE: 73 MMHG

## 2025-01-11 VITALS — SYSTOLIC BLOOD PRESSURE: 137 MMHG | DIASTOLIC BLOOD PRESSURE: 64 MMHG

## 2025-01-11 VITALS — SYSTOLIC BLOOD PRESSURE: 136 MMHG | DIASTOLIC BLOOD PRESSURE: 69 MMHG

## 2025-01-11 VITALS — DIASTOLIC BLOOD PRESSURE: 73 MMHG | SYSTOLIC BLOOD PRESSURE: 162 MMHG

## 2025-01-11 VITALS — DIASTOLIC BLOOD PRESSURE: 64 MMHG | SYSTOLIC BLOOD PRESSURE: 137 MMHG

## 2025-01-11 LAB
ANION GAP SERPL CALCULATED.4IONS-SCNC: 7.8 MMOL/L (ref 7–21)
BASOPHILS NFR BLD AUTO: 0.2 % (ref 0–2)
BUN SERPL-MCNC: 5 MG/DL (ref 7–18)
BUN/CREAT SERPL: 10.86 (ref 6–28.6)
CALCIUM SERPL-MCNC: 8 MG/DL (ref 8.5–10.1)
CHLORIDE SERPL-SCNC: 103 MMOL/L (ref 98–107)
CO2 SERPL-SCNC: 30 MMOL/L (ref 21–32)
DEPRECATED RDW RBC AUTO: 19.6 FL (ref 10.5–15)
EGFRCR SERPLBLD CKD-EPI 2021: 101 ML/MIN (ref 60–?)
EOSINOPHIL NFR BLD AUTO: 1.1 % (ref 0–6)
HCT VFR BLD AUTO: 23.8 % (ref 35–50)
HGB BLD-MCNC: 7.5 G/DL (ref 12–18)
LYMPHOCYTES NFR BLD AUTO: 29.3 % (ref 24–44)
MAGNESIUM SERPL-MCNC: 1.5 MG/DL (ref 1.8–2.4)
MCH RBC QN AUTO: 20.4 PG (ref 27–36)
MCHC RBC AUTO-ENTMCNC: 31.5 G/DL (ref 30–36)
MCV RBC AUTO: 64.8 FL (ref 81–99)
MONOCYTES NFR BLD AUTO: 17.8 % (ref 0–12)
NEUTROPHILS NFR BLD AUTO: 51.6 % (ref 39–80)
PHOSPHATE SERPL-MCNC: 2.7 MG/DL (ref 2.5–4.9)
PLATELET # BLD AUTO: 181 K/UL (ref 140–440)
POTASSIUM SERPL-SCNC: 3.8 MMOL/L (ref 3.5–5.1)
RBC # BLD AUTO: 3.68 M/UL (ref 4.3–5.7)

## 2025-01-11 NOTE — NUR
ASSESSMENT COMPLETE.  EXPLAINED MEDICATIONS, PT CONFUSED AS TO WHY HIS REQUIP
WAS DIFFERENT THAN HIS.  EXPLAINED.  FRESH ICE WATER GIVEN, MCMILLAN EMPTIED, VS
COMPLETED.  GARBAGES EMPTIED.  PT REPOSITIONED.  IV PATENT, LR INFUSING PER
ORDER.  CALL LIGHT WITHIN REACH, PT STATES HE WILL JUST HOLLAR OUT WHEN HE
NEEDS SOMETHING.

## 2025-01-11 NOTE — NUR
0900 MEDICATIONS ADMINISTERED PER THE EMAR. PATIENT IS SITTING UPRIGHT IN BED.
PATIENT EXPRESSED WANTING TO LAY BACK DOWN. PATIENT EDUCATED ON THE IMPORTANCE
OF SITTING UP DURING THE DAY. THIS RN WILL COME LOWER THE BED IN ABOUT
30 MINUTES. PATIENT EXPRESSED UNDERSTANDING. PATIENT STATED NO FURTHER NEEDS
AT THIS TIME. CALL LIGHT AND PERSONAL BELONGINGS ARE WITHIN REACH.

## 2025-01-11 NOTE — NUR
1200 INSULIN ADMINISTERED PER THE EMAR. IV MAGNESIUM COMPLETE. LR IS INFUSING
 ML/HR. PATIENT IS SAT UP IN THE CHAIR AND EATING LUNCH. PATIENT STATED
NO FURTHER NEEDS AT THIS TIME. CALL LIGHT AND PERSONAL BELONGINGS ARE WITHIN
REACH.

## 2025-01-11 NOTE — NUR
PATIENT IS LYING IN BED WITH EYES OPEN AND RESPIRATIONS ARE EVEN AND
UNLABORED. 2 RN SKIN ASSESSMENT COMPLETE WITH DEMAR GASTON RN. SKIN WITH
SCATTERED BRUISING AND SCATTERED SCABS NOTED. BILATERAL HEEL BLISTERS REMAIN
CLOSED WITH NO DRAINAGE NOTED. NEW ALEVYNS PLACED AT THIS TIME. BLISTERS ON
THE RIGHT GLUTE ARE INTACT WITH NO DRAINAGE NOTED. BARRIER CREAM AND DESENEX
POWDER IN PLACE. PATIENT WITH PASTE LIKE BOWEL MOVEMENT. PATIENT BRIEF
CHANGED. ORVILLE CARE AND MCMILLAN CARE COMPLETE. URINE IS YELLOW WITH SEDIMENT
NOTED. DESENEX POWDER PLACE ON THE COCCYX AND ORVILLE AREA FOR NOTED REDNESS.
PATIENT IS ALERT AND ORIENTED. GENERALIZED WEAKNESS NOTED. PATIENT WITH LR
INFUSING  ML/HR. IV DRESSING IS CLEAN, DRY, AND INTACT. IV FLUSHED WITH
10 ML NORMAL SALINE. CARDIAC WITH NORMAL S1 AND S2 ON AUSCULTATION. RADIAL AND
PEDAL PULSES ARE STRONG BILATERALLY. CAPILLARY REFILL IN THE UPPER AND LOWER
EXTREMITIES IS LESS THAN 3 SECONDS BILATERALLY. SENSATION INTACT WITH NO
COMPLAINTS OF NUMBNESS OR TINGLING. PATIENT IS ON A REGULAR DIET WITH MINCED
AND MOIST TEXTURE. PATIENT BOWEL TONES ARE ACTIVE IN ALL FOUR QUADRANTS.
PATIENT WITH POOR APPETITE AND ENSURES ENCOURAGE WITH MEALS. PATIENT IS ON
ROOM AIR AND LUNG SOUNDS ARE CLEAR BILATERALLY. PATIENT WITH NO COMPLAINTS OF
PAIN. PATIENT IS IN THE CHAIR POSITION IN BED. PATIENT STATED NO FURTHER NEEDS
AT THIS TIME. CALL LIGHT AND PERSONAL BELONGINGS ARE WITHIN REACH.

## 2025-01-11 NOTE — NUR
PATIENT REPOSITIONED IN BED WITH PILLOWS PLACED UNDER LEFT SIDE. 2 PILLOWS
PLACED UNDER BLE TO KEEP HEELS OFF BED. NO FURTHER NEEDS. CALL LIGHT IN
REACH.

## 2025-01-11 NOTE — NUR
PATIENT IS LYING IN BED WITH EYES CLOSED AND RESPIRATIONS ARE EVEN AND
UNLABORED. PATIENT VITAL SIGNS AND INTAKE AND OUTPUT VALUES TAKEN AND
DOCUMENTED IN THE CHART. WAFFLE MATTRESS PLACED ON THE PATIENTS BED AT THIS
TIME. PATIENT STATED NO FURTHER NEEDS AT THIS TIME. CALL LIGHT AND PERSONAL
BELONGINGS ARE WITHIN REACH.

## 2025-01-11 NOTE — NUR
ROUNDED.  PT WITH EYES CLOSED, RESP EVEN AND UNLABORED, PICKING AT THE AIR
WITH HIS RIGHT HAND, OVER THE LEFT.  IV INFUSING.  IV SITE WNL.

## 2025-01-11 NOTE — NUR
PATIENT TRANSFERRRED BACK TO THE BED WITH DIAZ LIFT WITH DAVID SANDOVAL. PATIENT
TOLERATED WELL WITH A LITTLE BIT OF DIZZINESS. PATIENT WITH A VISITOR IN THE
ROOM AT THIS TIME. PATIENT STATED NO FURTHER NEEDS. CALL LIGHT AND PERSONAL
BELONGINGS ARE WITHIN REACH.

## 2025-01-11 NOTE — NUR
PATIENT IS LYING IN THE CHAIR WITH A SHOWER CAP IN PLACE. IV SITE FLUSHED WITH
10 ML NORMAL SALINE AND THE DRESSING IS CLEAN, DRY, AND INTACT. IV MAGNESIUM
SULFATE IS INFUSING AT THIS TIME. PATIENT STATED NO FURTHER NEEDS AT THIS
TIME. CALL LIGHT AND PERSONAL BELONGINGS ARE WITHIN REACH.

## 2025-01-11 NOTE — NUR
PATIENT RESTING IN BED ON BACK. VS AND I&Os OBTAINED AND RECORDED. NEW BAG IV
FLUID INFUSING PER ORDER. ASSESSMENT COMPLETE. PATIENT REPOSITIONED IN BED
WITH PILLOWS UNDER BILAT HIPS. BLE ELEVATED WITH 2 PILLOWS. PATIENT HAS NO
FURTHER NEEDS. CALL LIGHT IN REACH.

## 2025-01-11 NOTE — NUR
PATIENT IS LYING IN BED WITH EYES CLOSED AND RESPIRATIONS ARE EVEN AND
UNLABORED. PATIENT WITH MUSIC ON. LR IS INFUSING  ML/HR. CALL LIGHT AND
PERSONAL BELONGINGS ARE WITHIN REACH.

## 2025-01-11 NOTE — NUR
REPORT RECEIVED FROM NIGHT SHIFT RN SLY. PATIENT IS LYING IN BED WITH EYES
OPEN AND RESPIRATIONS ARE EVEN AND UNLABORED. PATIENT IS LISTENING TO MUSIC.
CALL LIGHT AND PERSONAL BELONGINGS ARE WITHIN REACH.

## 2025-01-11 NOTE — NUR
PATIENT IS LYING IN BED AND SPEAKING WITH A VISITOR AT BEDSIDE. PATIENT WITH
NO COMPLAINTS OF PAIN WHEN ASKED BY RN. PATIENT IV SITE FLUSHED WITH 10 ML
NORMAL SALINE. LR IS INFUSING  ML/HR. IV DRESSING IS CLEAN, DRY, AND
INTACT. PATIENT IS ALERT AND ORIENTED TIMES FOUR. PATIENT STATED NO FURTHER
NEEDS AT THIS TIME. CALL LIGHT AND PERSONAL BELONGINGS ARE WITHIN REACH.

## 2025-01-11 NOTE — NUR
PATIENT BRIEF CHANGED WITH DAVID SANDOVAL. MOY PLACE ON THE PATIENTS COCCYX.
PATIENT TOLERATED WELL. PATIENT FLOATED WITH 2 PILLOWS ON EACH SIDE OF HIS
BODY. PATIENT STATED HEELS FLOATED WITH PILLOWS. PATIENT GIVEN A WARM BLANKET.
PATIENT STATED NO FURTHER NEEDS AT THIS TIME. CALL LIGHT AND PERSONAL
BELONGINGS ARE WITHIN REACH.

## 2025-01-11 NOTE — NUR
PATIENT IS SITTING UPRIGHT IN BED AND WATCHING TV. RESPIRATIONS ARE EVEN AND
UNLABORED. CALL LIGHT AND PERSONAL BELONGINGS ARE WITHIN REACH.

## 2025-01-12 VITALS — SYSTOLIC BLOOD PRESSURE: 163 MMHG | DIASTOLIC BLOOD PRESSURE: 73 MMHG

## 2025-01-12 VITALS — SYSTOLIC BLOOD PRESSURE: 140 MMHG | DIASTOLIC BLOOD PRESSURE: 68 MMHG

## 2025-01-12 VITALS — SYSTOLIC BLOOD PRESSURE: 151 MMHG | DIASTOLIC BLOOD PRESSURE: 59 MMHG

## 2025-01-12 VITALS — SYSTOLIC BLOOD PRESSURE: 148 MMHG | DIASTOLIC BLOOD PRESSURE: 69 MMHG

## 2025-01-12 VITALS — DIASTOLIC BLOOD PRESSURE: 69 MMHG | SYSTOLIC BLOOD PRESSURE: 140 MMHG

## 2025-01-12 LAB
ANION GAP SERPL CALCULATED.4IONS-SCNC: 8.2 MMOL/L (ref 7–21)
BASOPHILS NFR BLD AUTO: 0.4 % (ref 0–2)
BUN SERPL-MCNC: 3 MG/DL (ref 7–18)
BUN/CREAT SERPL: 7.31 (ref 6–28.6)
CALCIUM SERPL-MCNC: 8 MG/DL (ref 8.5–10.1)
CHLORIDE SERPL-SCNC: 104 MMOL/L (ref 98–107)
CO2 SERPL-SCNC: 29 MMOL/L (ref 21–32)
DEPRECATED RDW RBC AUTO: 19.8 FL (ref 10.5–15)
EGFRCR SERPLBLD CKD-EPI 2021: 104 ML/MIN (ref 60–?)
EOSINOPHIL NFR BLD AUTO: 1.3 % (ref 0–6)
HCT VFR BLD AUTO: 24.6 % (ref 35–50)
HGB BLD-MCNC: 7.7 G/DL (ref 12–18)
LYMPHOCYTES NFR BLD AUTO: 29 % (ref 24–44)
MAGNESIUM SERPL-MCNC: 1.6 MG/DL (ref 1.8–2.4)
MCH RBC QN AUTO: 20.2 PG (ref 27–36)
MCHC RBC AUTO-ENTMCNC: 31.1 G/DL (ref 30–36)
MCV RBC AUTO: 64.8 FL (ref 81–99)
MONOCYTES NFR BLD AUTO: 16.7 % (ref 0–12)
NEUTROPHILS NFR BLD AUTO: 52.6 % (ref 39–80)
PHOSPHATE SERPL-MCNC: 2.6 MG/DL (ref 2.5–4.9)
PLATELET # BLD AUTO: 186 K/UL (ref 140–440)
POTASSIUM SERPL-SCNC: 4.2 MMOL/L (ref 3.5–5.1)
RBC # BLD AUTO: 3.8 M/UL (ref 4.3–5.7)

## 2025-01-12 NOTE — NUR
ROUNDED ON PT.  EYES CLOSED, RESP EVEN AND UNLABORED.  TALKING AND MOVING LEFT
ARM, SAYING ITS DRY, I CAN'T REACH IT.  IV CONTINUES PER MARS.

## 2025-01-12 NOTE — NUR
PATIENT IS LYING IN THE CHAIR AT THIS TIME. WITH HOB DOWN AND BILATERAL LEGS
ELEVATED. PATIENT WITH NO COMPLAINTS OF PAIN AT THIS TIME. IV SITE FLUSHED
WITH 10 ML NORMAL SALINE. LR IS INFUSING  ML/HR. IV DRESSING IS CLEAN,
DRY, AND INTACT. ORVILLE CARE AND MCMILLAN CARE COMPLETE. PATIENT STATED NO FURTHER
NEEDS AT THIS TIME. CALL LIGHT AND PERSONAL BELONGINGS ARE WITHIN REACH.

## 2025-01-12 NOTE — NUR
PT ADMITTED FOR UTI, RENAL INSUFFICIENCY.  HAS SLEPT OFF AND ON;  COMPLAINT AT
HS WAS HIS RESTLESS LEGS, MEDICATED WITH REQUIP.  CHRONIC MCMILLAN WITH URIEL
URINE.  WAFFLE OVERLAY, PT SKIN CONTINUES WITH ALLEYNS AS PREVIOUSLY
DOCUMENTED BY PREVIOUS SHIFT.  PER  PROGRESS NOTE, DIET CHANGED TO ADA 60
CARB FROM THE REGULAR.  PT MENTATION IS MOSTLY ALERT, DOES NEED REMINDERS,
REINFORCEMENT.  NO BM'S THIS SHIFT AS OF THIS TIME.

## 2025-01-12 NOTE — NUR
PATIENT IV SITE FLUSHED WITH 10 ML NORMAL SALINE. IV MAGNESIUM SULFATE IS
INFUSING AT 50 ML/HR. PATIENT GIVEN FRESH WATER. PATIENT EXPRESSED CONCERN
ABOUT HIS RESTLESS LEGS. PATIENT EDUCATED HE RECEIVED A DOSE THIS MORNING OF
WHAT HE USUALLY TAKES AT HOME. PATIENT EXPRESSED UNDERSTANDING. PATIENT
STATED NO FURTHER NEEDS AT THIS TIME. CALL LIGHT AND PERSONAL BELONGINGS ARE
WITHIN REACH.

## 2025-01-12 NOTE — NUR
PATIENT IS LYING IN THE CHAIR WITH EYES CLOSED AND RESPIRATIONS ARE EVEN AND
UNLABORED. PATIENT IS LISTENING TO MUSIC. PATIENT STATED NO FURTHER NEEDS AT
THIS TIME. CALL LIGHT AND PERSONAL BELONGINGS ARE WITHIN REACH.

## 2025-01-12 NOTE — NUR
PATIENT IS LYING IN BED WITH EYES OPEN AND RESPIRATIONS ARE EVEN AND
UNLABORED. PATIENT IS LISTENING TO MUSIC. PATIENT STATED NO FURTHER NEEDS AT
THIS TIME. CALL LIGHT AND PERSONAL BELONGINGS ARE WITHIN REACH.

## 2025-01-12 NOTE — NUR
REPORT RECEIVED FROM NIGHT SHIFT KVNG HENDRICKSON. PATIENT IS LYING IN BED WITH EYES
CLOSED AND RESPIRATIONS ARE EVEN AND UNLABORED. CALL LIGHT AND PERSONAL
BELONGINGS ARE WITHIN REACH.

## 2025-01-12 NOTE — NUR
MORNING MEDICATIONS ADMINISTERED PER THE EMAR. IV SITE FLUSHED WITH 10 ML
NORMAL SALINE AND IV ROCEPHIN IS INFUSING  ML/HR. IV DRESSING IS CLEAN,
DRY, AND INTACT. BREAKFAST TRAY REMOVED AT THIS TIME. ENSURE PROVIDED. PATIENT
STATED NO FURTHER NEEDS AT THIS TIME. CALL LIGHT AND PERSONAL BELONGINGS ARE
WITHIN REACH.

## 2025-01-12 NOTE — NUR
1200 INSULIN ADMINISTERED PER THE EMAR. PATIENT IS COMPLETE WITH LUNCH TRAY.
LUNCH TRAY REMOVED AT THIS TIME. PATIENT CHAIR MOVED AND THE BILATERAL LOWER
EXTREMITIES ELEVATED. PATIENT STATED NO FURTHER NEEDS AT THIS TIME. CALL LIGHT
AND PERSONAL BELONGINGS ARE WITHIN REACH.

## 2025-01-12 NOTE — NUR
PATIENT IS LYING IN THE CHAIR WITH BILATERAL LOWER EXTREMITIES ELEVATED. FULL
ASSESSMENT COMPLETE AT THIS TIME. PATIENT IS ALERT AND ORIENTED TIMES FOUR BUT
IS FORGETFUL AT TIMES. PATIENT IS ON ROOM AIR AND LUNG SOUNDS ARE CLEAR
BILATERALLY. CARDIAC WITH NORMAL S1 AND S2 ON AUSCULTATION. RADIAL AND PEDAL
PULSES ARE STRONG BILATERALLY. CAPILLARY REFILL IS LESS THAN 3 SECONDS IN THE
UPPER AND LWOER EXTREMITIES. SENSATION INTACT WITH NO COMPLAINTS OF NUMBNESS
OR TINGLING. PATIENT WITH GENERALIZED WEAKNESS AND NO COMPLAINTS OF PAIN.
PATIENT IS ON ROOM AIR AND LUNG SOUNDS ARE CLEAR BILATERALLY. PATIENT IS ON A
60 GRAM CARB DIET. PATIENT WITH POOR APPETITE AND ENSURES ENCOURAGED WITH
MEALS. BOWEL TONES ARE ACTIVE IN ALL FOUR QUADRANTS. IV SITE FLUSHED WITH 10
ML NORMAL SALINE AND HAS LR INFUSING  ML/HR. IV DRESSING IS CLEAN, DRY,
AND INTACT. PATIENT WITH MCMILLAN CATHETER IN PLACE. BLISTERS NOTED TO BILATERAL
HEELS WITH NO DRAINAGE. PATIENT STATED NO FURTHER NEEDS AT THIS TIME. CALL
LIGHT AND PERSONAL BELONGINGS ARE WITHIN REACH.

## 2025-01-12 NOTE — NUR
PATIENT IS LYING IN BED WITH HOB ELEVATED. PATIENT WITH EYES CLOSED AND
RESPIRATIONS ARE EVEN AND UNLABORED. CALL LIGHT AND PERSONAL BELONGINGS ARE
WITHIN REACH.

## 2025-01-12 NOTE — NUR
Pt alert and oriented to all tonight. Cooperative with assessmebts, vitals and
turning and repositioning. On room air, lungs clear dim at bases. abd soft,
kyle, LBM 01/12, took Miralax and senna with juice w/o problems. bruised arms,
healilng, scaly skin, lotion applied. f/c patent, f/c care done, c/o tender
penis, no redness. patent, draining clear yellow urine, QS. LE elevated.
allevyn to both heels. soles very dry peeling skin lotion to area. Turned and
repositioned, cooperative, no c/o pain. Wiped own face and hands. Bed alarm in
place per fall precautions. took meds w/o problems

## 2025-01-12 NOTE — NUR
PATIENT IS LYING IN BED AND ASKED RN TO LOWER HIS HEAD DOWN. RN EDUCATED TO
STAY WITH HOB ELEVATED A LITTLE BIT LONGER AS HE JUST FINISHED DRINKING AN
ENSURE. PATIENT EXPRESSED UNDERSTANDING. PATIENT STATED NO FURTHER NEEDS AT
THIS TIME. CALL LIGHT AND PERSONAL BELONGINGS ARE WITHIN REACH.

## 2025-01-12 NOTE — NUR
NEW BAG IV FLUIDS SCANNED AND INFUSING.  PT SAID HELLO, ASKED HIM IF HE HAS
BEEN SLEEPING, HE STATED "I HAVEN'T BEEN DOING A VERY GOOD JOB OF IT".  NO
OTHER NEEDS.

## 2025-01-12 NOTE — NUR
PATIENT DIAZ LIFTED BACK TO BED WITH DAVID SANDOVAL. PATIENT BRIEF CHANGED OF
MEDIUM SIZED BM. FOAM ADHESIVE BORDER DRESSING CHANGED ON THE COCCYX. NEW
BARRIER CREAM APPLIED. PATIENT TOLERATED WELL. PATIENT WITH A NEW BRIEF IN
PLACE AND PATIENT TOLERATED WELL. PATIENT STATED NO FURTHER NEEDS AT THIS
TIME. CALL LIGHT AND PERSONAL BELONGINGS ARE WITHIN REACH.

## 2025-01-13 VITALS — SYSTOLIC BLOOD PRESSURE: 130 MMHG | DIASTOLIC BLOOD PRESSURE: 66 MMHG

## 2025-01-13 VITALS — SYSTOLIC BLOOD PRESSURE: 144 MMHG | DIASTOLIC BLOOD PRESSURE: 73 MMHG

## 2025-01-13 VITALS — SYSTOLIC BLOOD PRESSURE: 136 MMHG | DIASTOLIC BLOOD PRESSURE: 62 MMHG

## 2025-01-13 VITALS — SYSTOLIC BLOOD PRESSURE: 150 MMHG | DIASTOLIC BLOOD PRESSURE: 70 MMHG

## 2025-01-13 VITALS — DIASTOLIC BLOOD PRESSURE: 70 MMHG | SYSTOLIC BLOOD PRESSURE: 150 MMHG

## 2025-01-13 NOTE — NUR
CONTACTED BY KG AT [facility] REH[facility]. THEY ARE UNABLE TO
ACCEPT PATIENT DUE TO SEEKING OF CANCER TREATMENT AND DC PLAN FOLLOWING SNF
STAY. DID CONTACT [facility] AT [facility], PATIENT'S SON HAS NOT YET CALLED TO
INITIATE LONG TERM MEDICAID APPLICATION.

## 2025-01-13 NOTE — NUR
PATIENT IN BED RESTING WITH EYES CLOSED. VITALS AND I&O'S DONE AND CHARTED.
CALL LIGHT IN REACH. NO FURTHER NEEDS AT THIS TIME. Yes

## 2025-01-13 NOTE — NUR
PATIENT IN BED AT THIS TIME. CNA CHARTED PATIENTS BLOODSUGAR AND ASSISTED OT
TRANSFER PATIENT FROM CHAIR BACK TO BED. OT STILL IN ROOM AT THIS TIME.
PATIENT HAD NO FURTHER NEEDS AT THIS TIME.

## 2025-01-13 NOTE — NUR
Awake, trying to pull at iv lines. redirected, cooperatie with second
assessment and repositioning. f/c patent, draining clear yellow urine. IVF
infusing

## 2025-01-13 NOTE — NUR
REACHED OUT TO [facility] AND [facility] AT THE [place].
AWAITING ANSWER ON SNF ACCEPTANCE. [place] [facility] HAS NO BEDS UNTIL LATE THIS
WEEK. [facility] IS STILL REVIEWING THE CHART. CALLED [facility] TO SEE WHEN
HOSPITAL BED CAN BE DELIVERED. THEY ARE REVIEWING NOW TO SEE IF BED CAN
POTENTIALLY BE DELIVERED TODAY.

## 2025-01-13 NOTE — NUR
PATIENT IN CHAIR AT THIS TIME. CNA AND PT BOOSTED PATIENT IN CHAIR. CALL LIGHT
WITHIN REACH, NO FURTHER NEEDS AT THIS TIME.

## 2025-01-13 NOTE — NUR
CALLED AND SPOKE WITH DAUGHTER, SRAVANTIH. DISCUSSED DC PLAN WITH HER. STATES HE
HAS A WHEELCHAIR AND SOMEBODY COULD BRING IT TO HOSPITAL FOR TRANSPORT IF HE
IS DISCHARGED TO HOME. INFORMED HER OF DENIALS OF SNFS SO FAR AND IF HE HAS A
HOSPITAL BED AT HOME PT BELIEVES HE CAN BE SAFELY DISCHARGED. [facility] IS
SUPPOSED TO DELIVER HOSPITAL BED TO THE HOME TODAY AND IF DELIVERED, PLEASE
CALL THIS NURSE BACK TO WORK ON DISCHARGE. STATES SHE IS GOING TO CALL HER
BROTHER, ALE, AND VERIFY IF BED HAS BEEN DELIVERED.

## 2025-01-13 NOTE — NUR
PATIENT SITTING UP IN CHAIR AT THIS TIME. VITALS AND I&O'S DONE AND CHARTED.
PATIENT REPOSITIONED IN CHAIR. LINENS CHANGED. CALL LIGHT IN REACH. NO FURTHER
NEEDS AT THIS TIME.

## 2025-01-13 NOTE — NUR
PATIENT SON DON CALLED LET US KNOW [facility] DELIVERED BED AND HE WAS GOING TO
[facility] TO PICKUP DIAZ. DR. LUGO AWARE AND WRITING DISCHARGE. [facility] CALLED SAID THEY WOULD BE HERE TO PICK PATIENT UP AT 1715. SON TO BRING IN
PATIENT PERSONAL WHEELCHAIR. TAXI TICKET GIVEN.